# Patient Record
Sex: FEMALE | Race: WHITE | NOT HISPANIC OR LATINO | Employment: FULL TIME | URBAN - METROPOLITAN AREA
[De-identification: names, ages, dates, MRNs, and addresses within clinical notes are randomized per-mention and may not be internally consistent; named-entity substitution may affect disease eponyms.]

---

## 2018-03-14 ENCOUNTER — HOSPITAL ENCOUNTER (EMERGENCY)
Facility: HOSPITAL | Age: 43
Discharge: HOME | End: 2018-03-14
Attending: EMERGENCY MEDICINE | Admitting: EMERGENCY MEDICINE

## 2018-03-14 ENCOUNTER — APPOINTMENT (OUTPATIENT)
Dept: GENERAL RADIOLOGY | Facility: HOSPITAL | Age: 43
End: 2018-03-14

## 2018-03-14 VITALS
BODY MASS INDEX: 21.69 KG/M2 | DIASTOLIC BLOOD PRESSURE: 88 MMHG | HEIGHT: 66 IN | HEART RATE: 78 BPM | RESPIRATION RATE: 16 BRPM | SYSTOLIC BLOOD PRESSURE: 119 MMHG | TEMPERATURE: 97.3 F | WEIGHT: 135 LBS | OXYGEN SATURATION: 97 %

## 2018-03-14 DIAGNOSIS — S70.02XA CONTUSION OF LEFT HIP, INITIAL ENCOUNTER: Primary | ICD-10-CM
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
## 2018-03-14 PROCEDURE — 73502 X-RAY EXAM HIP UNI 2-3 VIEWS: CPT

## 2018-03-14 PROCEDURE — 99284 EMERGENCY DEPT VISIT MOD MDM: CPT

## 2018-03-14 RX ORDER — ONDANSETRON 4 MG/1
4 TABLET, ORALLY DISINTEGRATING ORAL ONCE
Status: COMPLETED | OUTPATIENT
Start: 2018-03-14 | End: 2018-03-14

## 2018-03-14 RX ORDER — HYDROCODONE BITARTRATE AND ACETAMINOPHEN 7.5; 325 MG/1; MG/1
1 TABLET ORAL ONCE
Status: COMPLETED | OUTPATIENT
Start: 2018-03-14 | End: 2018-03-14

## 2018-03-14 RX ORDER — HYDROCODONE BITARTRATE AND ACETAMINOPHEN 5; 325 MG/1; MG/1
1 TABLET ORAL EVERY 6 HOURS PRN
Qty: 15 TABLET | Refills: 0 | Status: SHIPPED | OUTPATIENT
Start: 2018-03-14

## 2018-03-14 RX ORDER — ONDANSETRON 4 MG/1
4 TABLET, FILM COATED ORAL EVERY 12 HOURS PRN
Qty: 10 TABLET | Refills: 0 | Status: SHIPPED | OUTPATIENT
Start: 2018-03-14

## 2018-03-14 RX ORDER — TRAZODONE HYDROCHLORIDE 100 MG/1
100 TABLET ORAL NIGHTLY
COMMUNITY

## 2018-03-14 RX ORDER — NAPROXEN SODIUM 220 MG
220 TABLET ORAL 2 TIMES DAILY PRN
COMMUNITY

## 2018-03-14 RX ADMIN — ONDANSETRON 4 MG: 4 TABLET, ORALLY DISINTEGRATING ORAL at 17:48

## 2018-03-14 RX ADMIN — HYDROCODONE BITARTRATE AND ACETAMINOPHEN 1 TABLET: 7.5; 325 TABLET ORAL at 18:25

## 2018-03-14 NOTE — ED TRIAGE NOTES
Pt reports she was at work and was taking trash out when she slipped and fell on her left hip.  Pt states she tried to get up but then fell again.

## 2018-03-14 NOTE — ED NOTES
Pt to ED s/p fall at work, pt reports slipped on ice, hit L hip on  Ground, tenderness with palpation, limited ROM to hip, low back pain with palpation on L side.   Pt states cannot take pain meds d/t makes her sick, pain management options discussed with dr toledo.      Silvana Ayon, RN  03/14/18 [id number]

## 2018-03-14 NOTE — ED PROVIDER NOTES
FAST TRACK EMERGENCY DEPARTMENT ENCOUNTER    CHIEF COMPLAINT  Chief Complaint: hip injury  History given by: patient, spouse  History limited by: nothing  CDU Room Number: 52/52  PMD: Lazaro Santana MD      HPI:  Pt is a 42 y.o. female who presents complaining of left hip pain s/p slip and fall at 1500 today. Pt states that she slipped on ice and landed on her left hip. Pt states that she attempted to stand upright but was not able to stand due to pain. Pt denies a blow to the head, chest injury, arm injury, HA or prior injury to left hip.    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 No pertinent family history.    SOCIAL HISTORY  Social History     Social History   • Marital status:      Spouse name: N/A   • Number of children: N/A   • Years of education: N/A     Occupational History   • Not on file.     Social History Main Topics   • Smoking status: Current Every Day Smoker     Packs/day: 1.00   • Smokeless tobacco: Not on file   • Alcohol use Yes      Comment: occ   • Drug use: No   • Sexual activity: Not on file     Other Topics Concern   • Not on file     Social History Narrative   • No narrative on file       ALLERGIES  Codeine    REVIEW OF SYSTEMS  Review of Systems   Constitutional: Negative for chills and fever.   HENT: Negative for sore throat.    Respiratory: Negative for cough.    Gastrointestinal: Negative for nausea and vomiting.   Genitourinary: Negative for dysuria.   Musculoskeletal:  Positive for arthralgias (L hip). Negative for back pain.   Psychiatric/Behavioral: The patient is not nervous/anxious.        PHYSICAL EXAM  ED Triage Vitals   Temp Heart Rate Resp BP SpO2   03/14/18 1655 03/14/18 1630 03/14/18 1630 03/14/18 1630 03/14/18 1630   97.3 °F (36.3 °C) 66 18 100/65 98 %      Temp src Heart Rate Source Patient Position BP Location FiO2 (%)   03/14/18 1655 -- -- -- --   Oral           Physical Exam   Constitutional: She is oriented to person, place, and time. No distress.   HENT:   Head: Normocephalic and atraumatic.   Eyes: EOM are normal. Pupils are equal, round, and reactive to light.   Neck: Normal range of motion. Neck supple. No spinous process tenderness and no muscular tenderness present.   Cardiovascular: Normal rate, regular rhythm, normal heart sounds and intact distal pulses.    Pulmonary/Chest: Effort normal and breath sounds normal. No respiratory distress.   Abdominal: Soft. There is no tenderness. There is no rebound and no guarding.   Musculoskeletal: She exhibits no edema.        Left hip: She exhibits tenderness (lateral very focal tenderness at greater trochanter prominance. No swelling or deformity on inspection.).        Thoracic back: She exhibits no tenderness.        Lumbar back: She exhibits no tenderness.   Pt is able to raise left heel off of the bed. Pt complains of mild pain with external or internal rotation of the LLE but is able to do internal and external rotation. Low index of suspicion of hip fracture. No inguinal pain.   Neurological: She is alert and oriented to person, place, and time. She has normal sensation and normal strength.   Skin: Skin is warm and dry. No rash noted.   Nursing note and vitals reviewed.    RADIOLOGY  XR Hip With or Without Pelvis 2 - 3 View Left   Final Result         L hip XR shows nothing acute.     I ordered the above noted radiological studies. Interpreted by radiologist. Reviewed by me in PACS.        PROCEDURES  Procedures      PROGRESS AND CONSULTS  ED Course     1718- Discussed the plan to order pain and nausea and imaging studies for further evaluation. Pt understands and agrees with the plan, all questions answered.    1742- Ordered L hip XR for further evaluation. Ordered Norco for pain and zofran for nausea.    1931- Rechecked pt. Pt states that her pain has improved after pain medication and she appears much more comfortable. On re-exam, the pt does not have any pain in her left hip with internal and external rotation and she is able to lift her LLE off of the bed. Notified pt and family of the pt's unremarkable imaging results. D/w pt and family that she does not have a left hip fracture clinically and that the pt's symptoms are likely due to a hip contusion. Discussed the plan ambulate the pt prior to discharging the pt home with a prescription for pain medication and instructed the pt to follow up with worker's compensation. Pt understands and agrees with the plan, all questions answered.    1938- Pt is able to stand and ambulate in the ED.    MEDICAL DECISION MAKING  Results were reviewed/discussed with the patient and they were also made aware of online access. Pt also made aware that follow up with PMD is necessary.     MDM  Number of Diagnoses or Management Options  Contusion of left hip, initial encounter:   Fall on same level from slipping, initial encounter:      Amount and/or Complexity of Data Reviewed  Tests in the radiology section of CPT®: ordered and reviewed (L hip XR shows nothing acute)  Decide to obtain previous medical records or to obtain history from someone other than the patient: yes  Obtain history from someone other than the patient: yes (family)  Review and summarize past medical records: yes (Pt has no prior medical records available.)  Independent visualization of images, tracings, or specimens: yes    Patient Progress  Patient progress: improved          DIAGNOSIS  Final diagnoses:   Contusion of left hip, initial encounter   Fall on same level from slipping, initial encounter       DISPOSITION  DISCHARGE    Patient discharged in stable condition.    Reviewed implications of results, diagnosis, meds, responsibility to follow up, warning signs and symptoms of possible worsening, potential complications and reasons to return to ER, including fever, worsening pain or any concerns.    Patient/Family voiced understanding of above instructions.    Discussed plan for discharge, as there is no emergent indication for admission. Patient referred to primary care provider for BP management due to today's BP. Pt/family is agreeable and understands need for follow up and repeat testing.  Pt is aware that discharge does not mean that nothing is wrong but it indicates no emergency is present that requires admission and they must continue care with follow-up as given below or physician of their choice.     FOLLOW-UP  [facility] OCCUPATIONAL MEDICINE [address] [address]  [phone]  Go in 1 day  Return if pain worsens, If symptoms worsen, weakness in extremities, shortness of breath, fever, any concerns         Medication List      New Prescriptions    HYDROcodone-acetaminophen 5-325 MG per tablet  Commonly known as:  NORCO  Take 1 tablet by mouth Every 6 (Six) Hours As Needed for Moderate Pain    for up to 15 doses.     ondansetron 4 MG tablet  Commonly known as:  ZOFRAN  Take 1 tablet by mouth Every 12 (Twelve) Hours As Needed for Nausea or   Vomiting.              Latest Documented Vital Signs:  As of 7:42 PM  BP- 103/71 HR- 78 Temp- 97.3 °F (36.3 °C) (Oral) O2 sat- 97%    --  Documentation assistance provided by mendoza Brasher for Dr. Resendez.  Information recorded by the mendoza was done at my direction and has been verified and validated by me.     Landy Brasher  03/14/18 1942       Michael Resendez MD  03/14/18  1946

## 2018-03-14 NOTE — ED NOTES
Pt tolerated ambulating 30 feet to nurses desk. Able to bear weight but reports pain and stiffness. Steps are slow, but solid. MD aware.     Juli Griffin RN  03/14/18 1941

## 2024-02-29 NOTE — ED NOTES
Report received from SUREKHA Pina. Awaiting disposition     Juli Griffin RN  03/14/18 1940     pancreatitis